# Patient Record
Sex: MALE | Race: OTHER | URBAN - METROPOLITAN AREA
[De-identification: names, ages, dates, MRNs, and addresses within clinical notes are randomized per-mention and may not be internally consistent; named-entity substitution may affect disease eponyms.]

---
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
## 2019-07-29 ENCOUNTER — OFFICE VISIT (OUTPATIENT)
Dept: FAMILY MEDICINE CLINIC | Age: 19
End: 2019-07-29
Payer: COMMERCIAL

## 2019-07-29 VITALS
HEIGHT: 68 IN | HEART RATE: 76 BPM | SYSTOLIC BLOOD PRESSURE: 112 MMHG | RESPIRATION RATE: 12 BRPM | BODY MASS INDEX: 22.37 KG/M2 | WEIGHT: 147.6 LBS | DIASTOLIC BLOOD PRESSURE: 67 MMHG | TEMPERATURE: 98.9 F | OXYGEN SATURATION: 97 %

## 2019-07-29 DIAGNOSIS — Z23 NEED FOR TDAP VACCINATION: ICD-10-CM

## 2019-07-29 DIAGNOSIS — Z23 NEED FOR MMR VACCINE: ICD-10-CM

## 2019-07-29 DIAGNOSIS — Z00.00 WELL ADULT EXAM: Primary | ICD-10-CM

## 2019-07-29 PROCEDURE — 99385 PREV VISIT NEW AGE 18-39: CPT | Performed by: FAMILY MEDICINE

## 2019-07-29 PROCEDURE — 90707 MMR VACCINE SC: CPT | Performed by: FAMILY MEDICINE

## 2019-07-29 PROCEDURE — 90471 IMMUNIZATION ADMIN: CPT | Performed by: FAMILY MEDICINE

## 2019-07-29 PROCEDURE — 90472 IMMUNIZATION ADMIN EACH ADD: CPT | Performed by: FAMILY MEDICINE

## 2019-07-29 PROCEDURE — 90715 TDAP VACCINE 7 YRS/> IM: CPT | Performed by: FAMILY MEDICINE

## 2019-07-29 SDOH — HEALTH STABILITY: MENTAL HEALTH: HOW OFTEN DO YOU HAVE A DRINK CONTAINING ALCOHOL?: NEVER

## 2019-07-29 ASSESSMENT — PATIENT HEALTH QUESTIONNAIRE - PHQ9
2. FEELING DOWN, DEPRESSED OR HOPELESS: 0
SUM OF ALL RESPONSES TO PHQ QUESTIONS 1-9: 0
SUM OF ALL RESPONSES TO PHQ9 QUESTIONS 1 & 2: 0
1. LITTLE INTEREST OR PLEASURE IN DOING THINGS: 0
SUM OF ALL RESPONSES TO PHQ QUESTIONS 1-9: 0

## 2019-07-29 ASSESSMENT — ENCOUNTER SYMPTOMS
DIARRHEA: 0
CONSTIPATION: 0
VOICE CHANGE: 0
ABDOMINAL PAIN: 0
BLOOD IN STOOL: 0
TROUBLE SWALLOWING: 0
SHORTNESS OF BREATH: 0
ANAL BLEEDING: 0
COUGH: 0
CHEST TIGHTNESS: 0
RHINORRHEA: 0

## 2019-07-29 NOTE — PROGRESS NOTES
Genitourinary: Negative for difficulty urinating, discharge, dysuria, frequency and hematuria. Neurological: Negative for dizziness, weakness and headaches. Hematological: Does not bruise/bleed easily. Psychiatric/Behavioral: Negative for dysphoric mood and sleep disturbance. The patient is not nervous/anxious. Vaccines reviewed; had only on MMR at age 2. Discussed with mom; he has no contraindication to vaccines; never had an adverse rxn. Objective:     Physical Exam  NAD  Skin is warm and dry. Well hydrated, no rash. Mood and affect are normal.  Ear exam - bilateral normal, TM intact without perforation or effusion, external canal normal. No significant ceruminosis noted. Nasal exam; septum midline, no deformities, nares patent, normal mucosa without swelling, no polyps, no bleeding. Pharynx normal, no oral lesions, dentition in good repair.   + right facial paralysis. Right arm tremor. The neck is supple and free of adenopathy or masses, the thyroid is normal without enlargement or nodules. Chest is clear, no wheezing or rales. Normal symmetric air entry throughout both lung fields. Heart regular with normal rate, no murmer or gallop  The abdomen is soft without tenderness, guarding, mass, rebound or organomegaly. Bowel sounds are normal. No CVA tenderness or inguinal adenopathy noted. Gait is normal.     Assessment / Plan:      Diagnosis Orders   1. Well adult exam  Exercise, diet, substance abuse addressed  DT every 10 years  Influenza vaccine annually      2. Need for Tdap vaccination  Tdap (age 6y and older) IM ([address])   3. Need for MMR vaccine  MMR vaccine subcutaneous         Follow up in 1 year or prn.

## 2021-11-21 PROBLEM — I63.89 PARIETAL LOBE INFARCTION (HCC): Status: ACTIVE | Noted: 2021-10-27

## 2021-11-21 PROBLEM — D32.9 MENINGIOMA (HCC): Status: ACTIVE | Noted: 2021-11-21

## 2021-11-21 PROBLEM — I63.89 PARIETAL LOBE INFARCTION (HCC): Status: ACTIVE | Noted: 2021-11-21

## 2021-11-22 NOTE — PROGRESS NOTES
Subjective:      Patient ID: Yassine Sams 24 y.o. male. The primary encounter diagnosis was Meningioma [facility]). Diagnoses of S/P craniotomy and Parietal lobe infarction [facility]) were also pertinent to this visit. MARCO Méndez is here for follow up after resection of meningioma one month ago. Had partial resection in 2002. Presented with HA and was noted to have increase in size of meningioma. He had right side weakness post op and was noted to have small parietal lobe/internal capsule infarct on MRI. [facility] for residual tumor is planned. Will need 5 times a week for 6 weeks. Will start in about a month. He is getting PT, OT and ST. He feels he is improving. He is on Keppra x 6 months for seizure prevention. Admits to being depressed. Is considering seeing a therapist. He is sleeping well and feels he does not need he Trazodone. Would like to get off it. He is tolerating it well. Was started post op. Appetite is fine, sleeping well. Not suicidal.     Had flu vaccine last week and will have COVID booster next week.      No results found for: NA, K, CL, CO2, BUN, CREATININE, GLUCOSE, CALCIUM, PROT, LABALBU, BILITOT, ALKPHOS, AST, ALT, LABGLOM, GFRAA, AGRATIO, GLOB       No results found for: WBC, HGB, HCT, MCV, PLT   Outpatient Medications Marked as Taking for the 11/24/21 encounter (Office Visit) with Michelle Hirsch MD   Medication Sig Dispense Refill    melatonin 3 MG TABS tablet Take 6 mg by mouth daily      traZODone (DESYREL) 50 MG tablet Take 50 mg by mouth nightly      levETIRAcetam (KEPPRA) 1000 MG tablet Take 1,000 mg by mouth 2 times daily          No Known Allergies    Patient Active Problem List   Diagnosis    M[facility]oma ([facility] [address]    Parietal lobe infarction [facility])       Past Medical History:   Diagnosis Date    Meningioma ([facility] [address] 2002       Past Surgical History:   Procedure Laterality Date    CRANIAL LESION RESECTION  11/09/2002    tentorial meningioma    CRANIOTOMY  10/27/2021    meningioma resection        No family history on file. Social History     Tobacco Use    Smoking status: Never Smoker    Smokeless tobacco: Never Used   Substance Use Topics    Alcohol use: Never    Drug use: Never            Review of Systems  Review of Systems    Objective:   Physical Exam  Vitals:    11/24/21 1411   BP: (!) 102/58   Pulse: 81   Resp: 12   Temp: 97.8 °F (36.6 °C)   TempSrc: Temporal   SpO2: 97%   Weight: 151 lb 9.6 oz (68.8 kg)       Physical Exam  NAD  Mood and affect are mildly depressed. No agitation or psychomotor retardation. Not suicidal.  Skin is warm and dry. Well hydrated, no rash. Incision left temporal scalp well healed, no induration or dehiscence. Chest is clear, no wheezing or rales. Normal symmetric air entry throughout both lung fields. Heart regular with normal rate, no murmer or gallop  Speech is slightly dysarthric, halting. Right UE spastic. Gait antalgic right      Assessment:       Diagnosis Orders   1. Meningioma [facility])  S/p excision with XRT planned   2. S/P craniotomy  Healing well   3. Parietal lobe infarction ([facility] [address]  Improving with PT, OT, ST   4. Reactive depression (situational)  Addressed. He can see Dr. Violette Petersen or therapist through [employer] at dad's work. Consider SSRI. He declines for now. Ok to wean Trazodone to 25 mg hs x 5 nights, 25 mg every other night x 3 to 4 doses, then d/c. Can resume prn          Plan:      Side effects of current medications reviewed and questions answered. Follow up in 3 months or prn.

## 2021-11-24 ENCOUNTER — OFFICE VISIT (OUTPATIENT)
Dept: FAMILY MEDICINE CLINIC | Age: 21
End: 2021-11-24
Payer: COMMERCIAL

## 2021-11-24 VITALS
RESPIRATION RATE: 12 BRPM | WEIGHT: 151.6 LBS | TEMPERATURE: 97.8 F | OXYGEN SATURATION: 97 % | DIASTOLIC BLOOD PRESSURE: 58 MMHG | BODY MASS INDEX: 23.24 KG/M2 | SYSTOLIC BLOOD PRESSURE: 102 MMHG | HEART RATE: 81 BPM

## 2021-11-24 DIAGNOSIS — D32.9 MENINGIOMA (HCC): Primary | ICD-10-CM

## 2021-11-24 DIAGNOSIS — Z98.890 S/P CRANIOTOMY: ICD-10-CM

## 2021-11-24 DIAGNOSIS — F32.9 REACTIVE DEPRESSION (SITUATIONAL): ICD-10-CM

## 2021-11-24 DIAGNOSIS — I63.89 PARIETAL LOBE INFARCTION (HCC): ICD-10-CM

## 2021-11-24 PROCEDURE — 99214 OFFICE O/P EST MOD 30 MIN: CPT | Performed by: FAMILY MEDICINE

## 2021-11-24 RX ORDER — LANOLIN ALCOHOL/MO/W.PET/CERES
6 CREAM (GRAM) TOPICAL DAILY
COMMUNITY
End: 2022-03-04

## 2021-11-24 RX ORDER — TRAZODONE HYDROCHLORIDE 50 MG/1
50 TABLET ORAL NIGHTLY
Qty: 30 TABLET | Refills: 2 | Status: SHIPPED | OUTPATIENT
Start: 2021-11-24 | End: 2022-03-04

## 2021-11-24 RX ORDER — LEVETIRACETAM 1000 MG/1
1000 TABLET ORAL 2 TIMES DAILY
Qty: 180 TABLET | Refills: 1 | Status: SHIPPED | OUTPATIENT
Start: 2021-11-24 | End: 2022-03-04

## 2021-11-24 RX ORDER — TRAZODONE HYDROCHLORIDE 50 MG/1
50 TABLET ORAL NIGHTLY
COMMUNITY
End: 2021-11-24 | Stop reason: SDUPTHER

## 2021-11-24 RX ORDER — LEVETIRACETAM 1000 MG/1
1000 TABLET ORAL 2 TIMES DAILY
COMMUNITY
End: 2021-11-24 | Stop reason: SDUPTHER

## 2021-11-24 SDOH — ECONOMIC STABILITY: FOOD INSECURITY: WITHIN THE PAST 12 MONTHS, YOU WORRIED THAT YOUR FOOD WOULD RUN OUT BEFORE YOU GOT MONEY TO BUY MORE.: NEVER TRUE

## 2021-11-24 SDOH — ECONOMIC STABILITY: FOOD INSECURITY: WITHIN THE PAST 12 MONTHS, THE FOOD YOU BOUGHT JUST DIDN'T LAST AND YOU DIDN'T HAVE MONEY TO GET MORE.: NEVER TRUE

## 2021-11-24 ASSESSMENT — PATIENT HEALTH QUESTIONNAIRE - PHQ9
SUM OF ALL RESPONSES TO PHQ QUESTIONS 1-9: 0
SUM OF ALL RESPONSES TO PHQ QUESTIONS 1-9: 0
SUM OF ALL RESPONSES TO PHQ9 QUESTIONS 1 & 2: 0
2. FEELING DOWN, DEPRESSED OR HOPELESS: 0
SUM OF ALL RESPONSES TO PHQ QUESTIONS 1-9: 0
1. LITTLE INTEREST OR PLEASURE IN DOING THINGS: 0

## 2021-11-24 ASSESSMENT — SOCIAL DETERMINANTS OF HEALTH (SDOH): HOW HARD IS IT FOR YOU TO PAY FOR THE VERY BASICS LIKE FOOD, HOUSING, MEDICAL CARE, AND HEATING?: NOT HARD AT ALL

## 2022-03-03 NOTE — PROGRESS NOTES
Subjective:      Patient ID: Rena Cobos 20 y.o. male. The primary encounter diagnosis was Reactive depression (situational). Diagnoses of Meningioma ([facility] [address] S/P craniotomy, and Parietal lobe infarction [facility]) were also pertinent to this visit. HPI    Meningioma: s/p resection 10/2021, and proton therapy on 12/27/21 (last tx 2/8/22? at [facility]   Had small parietal infarct post of. Had PT/OT/ST with good recovery of right arm weakness and sensory deficit. He has right side visual loss reported as improving. Sees Dr. Katherine Hayes. Was able to get off Keppra. Weaned off. Saw heme/onc on     Reactive depression:  Stopped taking Trazodone and does feel depressed. He has anxiety but no panic attacks. He does not feel he needs to treat this at this time. Appetite is fine. Mostly eats healthy foods. Was referred to nephrology for BP. At home -130/75-85. 144/90, 144/86 at the nephrologist.  Did eat a high Na lunch that day. Is going to do a 24 hour BP monitor. Had normal UA. Would like his spine checked. Had CT that showed dextroconvex thoracolumbar curve. He has no back pain. He just started to exercise, walking and resistance training. He is doing well with PT. Is still getting OT and ST. Still has loss of sensation right side, slowly coming back. Right shin mass he noted 4 years ago. Has not increased in size. Had bloody ejaculate x 3 over the past year. No hematuria, dysuria, weak stream, abdominal pain. Has not been sexually active.          No outpatient medications have been marked as taking for the 3/4/22 encounter (Office Visit) with Keren Benavidez MD.        No Known Allergies    Patient Active Problem List   Diagnosis    Meningioma ([facility] [address]    Parietal lobe infarction [facility])       Past Medical History:   Diagnosis Date    Meningioma ([facility] [address] 2002       Past Surgical History:   Procedure Laterality Date    CRANIAL LESION RESECTION  11/09/2002    tentorial meningioma    CRANIOTOMY  10/27/2021    meningioma resection        No family history on file. Social History     Tobacco Use    Smoking status: Never Smoker    Smokeless tobacco: Never Used   Substance Use Topics    Alcohol use: Never    Drug use: Never            Review of Systems  Review of Systems    Objective:   Physical Exam  Vitals:    03/04/22 1450   BP: 124/72   Pulse: 87   Resp: 14   Temp: 97.7 °F (36.5 °C)   TempSrc: Temporal   SpO2: 97%   Weight: 166 lb 6.4 oz (75.5 kg)   Height: 5' 8\" (1.727 m)     Wt Readings from Last 3 Encounters:   03/04/22 166 lb 6.4 oz (75.5 kg)   11/24/21 151 lb 9.6 oz (68.8 kg)   07/29/19 147 lb 9.6 oz (67 kg)      BP Readings from Last 3 Encounters:   03/04/22 124/72   11/24/21 (!) 102/58   07/29/19 112/67      Physical Exam  NAD  Skin is warm and dry. Mood and affect are mildly anxious. No agitation or psychomotor retardation. No pressured speech, grandiosity or tangential thoughts. Insight and judgement are intact. Not suicidal.   The neck is supple and free of adenopathy or masses, the thyroid is normal without enlargement or nodules. Chest is clear, no wheezing or rales. Normal symmetric air entry throughout both lung fields. Heart regular with normal rate, no murmer or gallop     Right shin 2 mm firm mobile subcut nodule. Mild thoracolumbar scoliosis  Genitals normal; both testes normal without tenderness, masses, hydroceles, varicoceles, erythema or swelling. Shaft normal, meatus normal without discharge. No inguinal hernia noted. No inguinal lymphadenopathy. Mild tremor. Assessment:       Diagnosis Orders   1. CINDY (generalized anxiety disorder)  Discussed meds, meditation, exercise, counseling. 2. Meningioma [facility])  Per [facility]   3. S/P craniotomy  Doing well with PT/OT/ST   4. Parietal lobe infarction [facility])  Doing well with PT/OT/ST   5. Other idiopathic scoliosis, thoracolumbar region  Core strengthening exercises, monitor. Reassured will likely stay stable. 6. Bloody ejaculation  Reassured           Plan:      Follow up 3 to 6 months or prn.

## 2022-03-04 ENCOUNTER — OFFICE VISIT (OUTPATIENT)
Dept: FAMILY MEDICINE CLINIC | Age: 22
End: 2022-03-04
Payer: COMMERCIAL

## 2022-03-04 VITALS
BODY MASS INDEX: 25.22 KG/M2 | WEIGHT: 166.4 LBS | RESPIRATION RATE: 14 BRPM | DIASTOLIC BLOOD PRESSURE: 72 MMHG | OXYGEN SATURATION: 97 % | HEIGHT: 68 IN | HEART RATE: 87 BPM | SYSTOLIC BLOOD PRESSURE: 124 MMHG | TEMPERATURE: 97.7 F

## 2022-03-04 DIAGNOSIS — D32.9 MENINGIOMA (HCC): ICD-10-CM

## 2022-03-04 DIAGNOSIS — M41.25 OTHER IDIOPATHIC SCOLIOSIS, THORACOLUMBAR REGION: ICD-10-CM

## 2022-03-04 DIAGNOSIS — F41.1 GAD (GENERALIZED ANXIETY DISORDER): Primary | ICD-10-CM

## 2022-03-04 DIAGNOSIS — Z98.890 S/P CRANIOTOMY: ICD-10-CM

## 2022-03-04 DIAGNOSIS — I63.89 PARIETAL LOBE INFARCTION (HCC): ICD-10-CM

## 2022-03-04 DIAGNOSIS — R36.1 BLOODY EJACULATION: ICD-10-CM

## 2022-03-04 PROCEDURE — 99214 OFFICE O/P EST MOD 30 MIN: CPT | Performed by: FAMILY MEDICINE

## 2023-01-03 ENCOUNTER — HOSPITAL ENCOUNTER (EMERGENCY)
Age: 23
Discharge: HOME | End: 2023-01-03
Attending: EMERGENCY MEDICINE
Payer: COMMERCIAL

## 2023-01-03 ENCOUNTER — TELEPHONE (OUTPATIENT)
Dept: FAMILY MEDICINE CLINIC | Age: 23
End: 2023-01-03

## 2023-01-03 VITALS
DIASTOLIC BLOOD PRESSURE: 73 MMHG | TEMPERATURE: 99.5 F | OXYGEN SATURATION: 97 % | SYSTOLIC BLOOD PRESSURE: 136 MMHG | BODY MASS INDEX: 25.47 KG/M2 | WEIGHT: 171.96 LBS | HEIGHT: 69 IN | HEART RATE: 89 BPM | RESPIRATION RATE: 16 BRPM

## 2023-01-03 DIAGNOSIS — R51.9 ACUTE NONINTRACTABLE HEADACHE, UNSPECIFIED HEADACHE TYPE: Primary | ICD-10-CM

## 2023-01-03 LAB
RAPID INFLUENZA  B AGN: NEGATIVE
RAPID INFLUENZA A AGN: NEGATIVE
S PYO AG THROAT QL: NEGATIVE
SARS-COV-2, NAAT: NOT DETECTED

## 2023-01-03 PROCEDURE — 99283 EMERGENCY DEPT VISIT LOW MDM: CPT

## 2023-01-03 PROCEDURE — 87804 INFLUENZA ASSAY W/OPTIC: CPT

## 2023-01-03 PROCEDURE — 87081 CULTURE SCREEN ONLY: CPT

## 2023-01-03 PROCEDURE — 87880 STREP A ASSAY W/OPTIC: CPT

## 2023-01-03 PROCEDURE — 87635 SARS-COV-2 COVID-19 AMP PRB: CPT

## 2023-01-03 RX ORDER — IBUPROFEN 600 MG/1
600 TABLET ORAL EVERY 6 HOURS PRN
COMMUNITY

## 2023-01-03 RX ORDER — MULTIVITAMIN
1 TABLET ORAL DAILY
COMMUNITY

## 2023-01-03 ASSESSMENT — PAIN - FUNCTIONAL ASSESSMENT: PAIN_FUNCTIONAL_ASSESSMENT: 0-10

## 2023-01-03 ASSESSMENT — ENCOUNTER SYMPTOMS
SHORTNESS OF BREATH: 0
VOICE CHANGE: 0
SORE THROAT: 1
COUGH: 0
TROUBLE SWALLOWING: 0
GASTROINTESTINAL NEGATIVE: 1
RHINORRHEA: 0

## 2023-01-03 ASSESSMENT — PAIN DESCRIPTION - PAIN TYPE: TYPE: ACUTE PAIN

## 2023-01-03 ASSESSMENT — PAIN DESCRIPTION - LOCATION: LOCATION: HEAD

## 2023-01-03 ASSESSMENT — PAIN SCALES - GENERAL: PAINLEVEL_OUTOF10: 3

## 2023-01-03 ASSESSMENT — PAIN DESCRIPTION - DESCRIPTORS: DESCRIPTORS: ACHING

## 2023-01-03 NOTE — DISCHARGE INSTRUCTIONS
Please see the attached instructions for how to best care for yourself and when you should return to the emergency department. Please call your primary care physician to schedule a follow-up appointment for soon as possible. You can alternate Tylenol and ibuprofen as needed for the pain. Please do not exceed the recommended dose on the bottle. When should you call for help? Call 911 anytime you think you may need emergency care. For example, call if:    You have signs of a stroke. These may include:  Sudden numbness, paralysis, or weakness in your face, arm, or leg, especially on only one side of your body. Sudden vision changes. Sudden trouble speaking. Sudden confusion or trouble understanding simple statements. Sudden problems with walking or balance. A sudden, severe headache that is different from past headaches. Call your doctor now or seek immediate medical care if:    You have a new or worse headache. Your headache gets much worse. When should you call for help? Call your doctor now or seek immediate medical care if:    You have a fever of 104°F or higher. You have a fever that stays high. You have a fever and feel confused or often feel dizzy. You have trouble breathing. You have a fever with a stiff neck or a severe headache. Watch closely for changes in your health, and be sure to contact your doctor if:    You have any problems with your medicine, or you get a fever after starting a new medicine. You do not get better as expected.

## 2023-01-03 NOTE — ED TRIAGE NOTES
Patient arrived to ED with complaints of flu-like symptoms. Patient stated that he was recently at a wedding with a lot of people and a couple of whom tested positive for COVID.

## 2023-01-03 NOTE — ED PROVIDER NOTES
ED Attending Attestation Note     Date of evaluation: 1/3/2023    This patient was seen by the advance practice provider. I have seen and examined the patient, agree with the workup, evaluation, management and diagnosis. The care plan has been discussed. My assessment reveals a 30-year-old male with past medical history of tentorial meningioma status post resection who presents to the emergency department with about 1 day of fever and headache as well as a mild sore throat. Headache is frontal, constant, mild relief with over-the-counter medications at home. Not better or worse at any particular time of day, not positional, not associated with any focal neurologic symptoms (although patient does have some baseline right-sided weakness following his meningioma resection). Denies nausea and vomiting. States that he was advised to be seen in the ED by his PCP. On my assessment the patient is afebrile, hemodynamically stable, in no acute distress. Heart lungs are clear to auscultation, he is grossly neurologically intact, he has some oropharyngeal erythema but no appreciable edema, tonsillar exudates, or palatal asymmetry. No nuchal rigidity. Overall no clinical concern at this time for meningitis.       Dorota Mandel MD  [address], [place]  01/03/23 [id number]

## 2023-01-03 NOTE — TELEPHONE ENCOUNTER
Patient had a fever of 104 this morning. This started yesterday evening. He does have chills as well.

## 2023-01-03 NOTE — ED PROVIDER NOTES
[address] [facility] [address] ENCOUNTER          PHYSICIAN ASSISTANT NOTE       Date of evaluation: 1/3/2023    Chief Complaint     Headache, Fever (At home 102F), and Back Pain      History of Present Illness     Oni Das is a 20 y.o. male who presents with a fever, headache, and back pain. Patient reports that yesterday evening, started having a headache. Took his temperature and it was 102F. Took some Motrin. Around 2 AM, took temperature again and it was 98.6F. This AM when he woke up, took his temperature and it was 104F, so they called his PCP and was advised to come in for evaluation. Patient reports a headache yesterday evening which has continued but has been minimally relieved with motrin. Feels similar to prior headaches and is not the worst headache he's had. Also had a \"scratchy\" throat this morning. He was at a wedding a few days ago in [address] and had known sick contacts there where other guests had high fevers and tested positive for COVID. No neck pain or stiffness. Denies chills, nausea, vomiting, chest pain, shortness of breath, abdominal pain, or any changes to bowel or bladder habits. Review of Systems     Review of Systems   Constitutional:  Negative for chills and fever. HENT:  Positive for sore throat. Negative for congestion, rhinorrhea, sneezing, trouble swallowing and voice change. Respiratory:  Negative for cough and shortness of breath. Cardiovascular:  Negative for chest pain. Gastrointestinal: Negative. Genitourinary:  Negative for dysuria, frequency and urgency. Musculoskeletal:  Negative for arthralgias, myalgias, neck pain and neck stiffness. Skin: Negative. Neurological:  Negative for weakness and numbness. Past Medical, Surgical, Family, and Social History     He has a past medical history of Meningioma ([facility] [address]   He has a past surgical history that includes cranial lesion resection (11/09/2002) and craniotomy (10/27/2021). His family history is not on file. He reports that he has never smoked. He has never used smokeless tobacco. He reports that he does not drink alcohol and does not use drugs. Medications     Discharge Medication List as of 1/3/2023  2:56 PM        CONTINUE these medications which have NOT CHANGED    Details   ibuprofen (ADVIL;MOTRIN) 600 MG tablet Take 600 mg by mouth every 6 hours as needed for PainHistorical Med      Multiple Vitamin (MULTI-VITAMIN DAILY) TABS Take 1 tablet by mouth dailyHistorical Med             Allergies     He has No Known Allergies. Physical Exam     INITIAL VITALS: BP: 136/73, Temp: 98.2 °F (36.8 °C), Heart Rate: (!) 102, Resp: 18, SpO2: 96 %  Physical Exam  Constitutional:       General: He is not in acute distress. Appearance: Normal appearance. He is not ill-appearing, toxic-appearing or diaphoretic. Comments: Patient resting comfortably in the bed. HENT:      Head: Normocephalic and atraumatic. Mouth/Throat:      Comments: Oropharynx without erythema, exudates, lesions, or swelling. Uvula midline. Tonsils are symmetric with no edema, erythema, or exudate. Gums without erythema. No trismus. Handling secretions well, no drooling. No hot potato voice. Eyes:      General:         Right eye: No discharge. Left eye: No discharge. Cardiovascular:      Rate and Rhythm: Normal rate and regular rhythm. Heart sounds: Normal heart sounds. No murmur heard. No friction rub. No gallop. Pulmonary:      Effort: Pulmonary effort is normal. No respiratory distress. Breath sounds: Normal breath sounds. No stridor. No wheezing, rhonchi or rales. Musculoskeletal:         General: No swelling, tenderness, deformity or signs of injury. Comments: Moving all extremities spontaneously and equally. No midline tenderness, step-offs or deformities to cervical, thoracic, or lumbar spine. No overlying skin changes.      Skin:     General: Skin is warm and dry. Findings: No bruising, erythema, lesion or rash. Neurological:      General: No focal deficit present. Mental Status: He is alert and oriented to person, place, and time. Motor: No weakness. Gait: Gait normal.      Comments: PERRL, EOMI, no slurred speech, no facial asymmetry, facial sensation intact, no pronator drift, normal heel to shin, 5/5 strength in bilateral upper and lower extremities, normal gait. Slightly decreased sensation in right upper and lower extremity which patients state is their baseline. Psychiatric:         Mood and Affect: Mood normal.         Behavior: Behavior normal.        Diagnostic Results     EKG   None    RADIOLOGY:  No orders to display       LABS:   Results for orders placed or performed during the hospital encounter of 01/03/23   COVID-19, Rapid    Specimen: Nasopharyngeal Swab   Result Value Ref Range    SARS-CoV-2, NAAT Not Detected Not Detected   Rapid influenza A/B antigens    Specimen: Nasopharyngeal   Result Value Ref Range    Rapid Influenza A Ag Negative Negative    Rapid Influenza B Ag Negative Negative   Strep Screen Group A Throat    Specimen: Throat   Result Value Ref Range    Rapid Strep A Screen Negative Negative       ED BEDSIDE ULTRASOUND:  None    RECENT VITALS:  BP: 136/73, Temp: 99.5 °F (37.5 °C), Heart Rate: 89, Resp: 16, SpO2: 97 %     Procedures     Procedures  None    ED Course     Nursing Notes, Past Medical Hx,Past Surgical Hx, Social Hx, Allergies, and Family Hx were reviewed. The patient was given the following medications:  No orders of the defined types were placed in this encounter. CONSULTS:  None    MEDICAL DECISION MAKING / ASSESSMENT / PLAN     Enoch Schaffer is a 20 y.o. male with history of meningioma, CINDY, parietal lobe infarction who presents with fever, headache, and sore throat that started yesterday and fever is resolved with Motrin.  On exam, patient is hemodynamically stable with no signs of respiratory distress and is afebrile. Nonfocal neurologic exam with baseline decreased sensation on right side. No midline cervical spine tenderness. See above for additional history and physical exam findings. Chart review, patient called PCP today and reported a fever of 102 and was told to come to ED. COVID and Flu swabs negative. Strep test negative. In regards to patients headache, I believe this is a benign-appearing headache. Patient state sthey regularly get headaches and this feels similar to prior and is not the worst headache they've ever had. The neurologic examination is normal.  My suspicion for serious pathology is low given a lack of significant risk factors and reassuring history and physical examination. No neck tenderness or stiffness. No clinical concern for meningitis. Known sick contact with similar symptoms who tested positive for COVID. Symptoms are improved with motrin and patient has not had a fever since taking motrin earlier this morning. At this time, I believe patient likely has a viral illness. No emergent pathologies noted. Advised patient to take tylenol and ibuprofen as needed for the pain and fever. Advised to follow-up with PCP. Patient is agreeable with this plan. I do not believe that this patient requires any further work-up or inpatient management for their complaints, and are appropriate for outpatient follow-up. The patient has remained hemodynamically stable throughout their stay in the emergency department, and appears well overall. I discussed the findings of my physical exam and work-up with the patient, and they were given the opportunity to ask questions. The patient is agreeable with the plan and is ready to be discharged. Patient instructed to follow-up with PCP as soon as possible, and given strict return precautions.       The patient was evaluated by myself and the ED Attending Physician, Dr. Andrea Mcdonald. All management and disposition plans were discussed and agreed upon. This note was dictated using voice-recognition software, which occasionally leads to inadvertent typographic errors. Clinical Impression     1.  Acute nonintractable headache, unspecified headache type        Disposition     PATIENT REFERRED TO:   MD Nati Garcia [address]  [address]  [phone]    Schedule an appointment as soon as possible for a visit       [facility], [facility]. Emergency [address]  [address]  [phone]    As needed, If symptoms worsen    DISCHARGE MEDICATIONS:   Discharge Medication List as of 1/3/2023  2:56 PM          DISPOSITION Decision To Discharge 01/03/2023 02:55:32 PM        Debi Joyce PA-C  01/03/23 4413

## 2023-01-05 LAB — S PYO THROAT QL CULT: NORMAL

## 2023-03-13 NOTE — PROGRESS NOTES
Preoperative Consultation      Dillno Altman  YOB: 2000    Date of Service:  3/17/2023    Vitals:    03/17/23 1515 03/17/23 1555   BP: 138/78 112/70   Pulse: 80    Resp: 14    Temp: 97.8 °F (36.6 °C)    TempSrc: Temporal    SpO2: 98%    Weight: 163 lb 6.4 oz (74.1 kg)    Height: 5' 9\" (1.753 m)       Wt Readings from Last 2 Encounters:   03/17/23 163 lb 6.4 oz (74.1 kg)   01/03/23 171 lb 15.3 oz (78 kg)     BP Readings from Last 3 Encounters:   01/03/23 136/73   03/04/22 124/72   11/24/21 (!) 102/58        Chief Complaint   Patient presents with    Pre-op Exam     Left Temporal Craniotomy with Stereotactic Guidance for Resection of Tumor with Intraoperative U/S and Spinal Neuro monitoring    At [facility] with Michael Gloria M.D.     No Known Allergies  Outpatient Medications Marked as Taking for the 3/17/23 encounter (Office Visit) with Nevin Mitchell MD   Medication Sig Dispense Refill    ibuprofen (ADVIL;MOTRIN) 600 MG tablet Take 600 mg by mouth every 6 hours as needed for Pain      Multiple Vitamin (MULTI-VITAMIN DAILY) TABS Take 1 tablet by mouth daily         This patient presents to the office today for a preoperative consultation at the request of surgeon, Dr. iMchael Gloria, who plans on performing   Left Temporal Craniotomy with Stereotactic Guidance for Resection of Tumor with Intraoperative U/S and Spinal Neuromonitoring.      on March 24 at [facility].      Planned anesthesia: General   Known anesthesia problems: None   Bleeding risk: No recent or remote history of abnormal bleeding  Personal or FH of DVT/PE: Yes - PGM had DVT in her 80s post-op colon cancer surgery.    Patient objection to receiving blood products: No    Patient Active Problem List   Diagnosis    Meningioma (HCC)    Parietal lobe infarction (HCC)    CINDY (generalized anxiety disorder)    Other idiopathic scoliosis, thoracolumbar region    Bloody ejaculation       Past  Medical History:   Diagnosis Date    Meningioma [facility]) 2002     Past Surgical History:   Procedure Laterality Date    CRANIAL LESION RESECTION  11/09/2002    tentorial meningioma    CRANIOTOMY  10/27/2021    meningioma resection     No family history on file. Social History     Socioeconomic History    Marital status: Single     Spouse name: Not on file    Number of children: Not on file    Years of education: Not on file    Highest education level: Not on file   Occupational History    Not on file   Tobacco Use    Smoking status: Never    Smokeless tobacco: Never   Substance and Sexual Activity    Alcohol use: Never    Drug use: Never    Sexual activity: Never   Other Topics Concern    Not on file   Social History Narrative    Not on file     Social Determinants of Health     Financial Resource Strain: Not on file   Food Insecurity: Not on file   Transportation Needs: Not on file   Physical Activity: Not on file   Stress: Not on file   Social Connections: Not on file   Intimate Partner Violence: Not on file   Housing Stability: Not on file       Review of Systems  A comprehensive review of systems was negative except for what was noted in the HPI. Physical Exam   Constitutional: He is oriented to person, place, and time. He appears well-developed and well-nourished. No distress. HENT:   Head: Normocephalic and atraumatic. Mouth/Throat: Uvula is midline, oropharynx is clear and moist and mucous membranes are normal.   Eyes: Conjunctivae and EOM are normal. Pupils are equal, round, and reactive to light. Neck: Trachea normal and normal range of motion. Neck supple. No JVD present. Carotid bruit is not present. No mass and no thyromegaly present. Cardiovascular: Normal rate, regular rhythm, normal heart sounds and intact distal pulses. Exam reveals no gallop and no friction rub. No murmur heard. Pulmonary/Chest: Effort normal and breath sounds normal. No respiratory distress. He has no wheezes.  He has no rales. Abdominal: Soft. Normal aorta and bowel sounds are normal. He exhibits no distension and no mass. There is no hepatosplenomegaly. No tenderness. Musculoskeletal: He exhibits no edema and no tenderness. Neurological: He is alert and oriented to person, place, and time. He has normal strength. No cranial nerve deficit or sensory deficit.gati antalgic left. Skin: Skin is warm and dry. No rash noted. No erythema. Psychiatric: He has a normal mood and affect. His behavior is normal.     EKG Interpretation:  normal EKG, normal sinus rhythm, unchanged from previous tracings. Lab Review reviewed on [facility], normal.         Assessment:       24 y.o. patient with planned surgery as above. Known risk factors for perioperative complications: None  Current medications which may produce withdrawal symptoms if withheld perioperatively: none      Plan:     1. Preoperative workup as follows: ECG, hemoglobin, hematocrit, electrolytes, creatinine, glucose  2. Change in medication regimen before surgery: N/A- not on any medications. Hold NSAIDS for one week prior to surgery. 3. Prophylaxis for cardiac events with perioperative beta-blockers: Not indicated  ACC/AHA indications for pre-operative beta-blocker use:    Vascular surgery with history of postitive stress test  Intermediate or high risk surgery with history of CAD   Intermediate or high risk surgery with multiple clinical predictors of CAD- 2 of the following: history of compensated or prior heart failure, history of cerebrovascular disease, DM, or renal insufficiency    Routine administration of higher-dose, long-acting metoprolol in beta-blocker-naïve patients on the day of surgery, and in the absence of dose titration is associated with an overall increase in mortality. Beta-blockers should be started days to weeks prior to surgery and titrated to pulse < 70.  4. Deep vein thrombosis prophylaxis: regimen to be chosen by surgical team  5. No contraindications to planned surgery

## 2023-03-17 ENCOUNTER — OFFICE VISIT (OUTPATIENT)
Dept: FAMILY MEDICINE CLINIC | Age: 23
End: 2023-03-17
Payer: COMMERCIAL

## 2023-03-17 VITALS
TEMPERATURE: 97.8 F | BODY MASS INDEX: 24.2 KG/M2 | DIASTOLIC BLOOD PRESSURE: 70 MMHG | SYSTOLIC BLOOD PRESSURE: 112 MMHG | HEART RATE: 80 BPM | RESPIRATION RATE: 14 BRPM | HEIGHT: 69 IN | OXYGEN SATURATION: 98 % | WEIGHT: 163.4 LBS

## 2023-03-17 DIAGNOSIS — D32.9 MENINGIOMA (HCC): ICD-10-CM

## 2023-03-17 DIAGNOSIS — Z01.818 PREOP EXAMINATION: Primary | ICD-10-CM

## 2023-03-17 PROCEDURE — 99215 OFFICE O/P EST HI 40 MIN: CPT | Performed by: FAMILY MEDICINE

## 2023-03-17 PROCEDURE — 93000 ELECTROCARDIOGRAM COMPLETE: CPT | Performed by: FAMILY MEDICINE

## 2023-03-17 SDOH — ECONOMIC STABILITY: HOUSING INSECURITY
IN THE LAST 12 MONTHS, WAS THERE A TIME WHEN YOU DID NOT HAVE A STEADY PLACE TO SLEEP OR SLEPT IN A SHELTER (INCLUDING NOW)?: NO

## 2023-03-17 SDOH — ECONOMIC STABILITY: INCOME INSECURITY: HOW HARD IS IT FOR YOU TO PAY FOR THE VERY BASICS LIKE FOOD, HOUSING, MEDICAL CARE, AND HEATING?: NOT HARD AT ALL

## 2023-03-17 SDOH — ECONOMIC STABILITY: FOOD INSECURITY: WITHIN THE PAST 12 MONTHS, THE FOOD YOU BOUGHT JUST DIDN'T LAST AND YOU DIDN'T HAVE MONEY TO GET MORE.: NEVER TRUE

## 2023-03-17 SDOH — ECONOMIC STABILITY: FOOD INSECURITY: WITHIN THE PAST 12 MONTHS, YOU WORRIED THAT YOUR FOOD WOULD RUN OUT BEFORE YOU GOT MONEY TO BUY MORE.: NEVER TRUE

## 2023-03-17 ASSESSMENT — PATIENT HEALTH QUESTIONNAIRE - PHQ9
7. TROUBLE CONCENTRATING ON THINGS, SUCH AS READING THE NEWSPAPER OR WATCHING TELEVISION: 0
10. IF YOU CHECKED OFF ANY PROBLEMS, HOW DIFFICULT HAVE THESE PROBLEMS MADE IT FOR YOU TO DO YOUR WORK, TAKE CARE OF THINGS AT HOME, OR GET ALONG WITH OTHER PEOPLE: 0
SUM OF ALL RESPONSES TO PHQ9 QUESTIONS 1 & 2: 0
SUM OF ALL RESPONSES TO PHQ QUESTIONS 1-9: 0
3. TROUBLE FALLING OR STAYING ASLEEP: 0
9. THOUGHTS THAT YOU WOULD BE BETTER OFF DEAD, OR OF HURTING YOURSELF: 0
8. MOVING OR SPEAKING SO SLOWLY THAT OTHER PEOPLE COULD HAVE NOTICED. OR THE OPPOSITE, BEING SO FIGETY OR RESTLESS THAT YOU HAVE BEEN MOVING AROUND A LOT MORE THAN USUAL: 0
4. FEELING TIRED OR HAVING LITTLE ENERGY: 0
SUM OF ALL RESPONSES TO PHQ QUESTIONS 1-9: 0
2. FEELING DOWN, DEPRESSED OR HOPELESS: 0
SUM OF ALL RESPONSES TO PHQ QUESTIONS 1-9: 0
5. POOR APPETITE OR OVEREATING: 0
SUM OF ALL RESPONSES TO PHQ QUESTIONS 1-9: 0
6. FEELING BAD ABOUT YOURSELF - OR THAT YOU ARE A FAILURE OR HAVE LET YOURSELF OR YOUR FAMILY DOWN: 0
1. LITTLE INTEREST OR PLEASURE IN DOING THINGS: 0

## 2023-03-27 ENCOUNTER — TELEPHONE (OUTPATIENT)
Dept: FAMILY MEDICINE CLINIC | Age: 23
End: 2023-03-27

## 2023-09-28 ENCOUNTER — TELEPHONE (OUTPATIENT)
Age: 23
End: 2023-09-28

## 2023-09-28 DIAGNOSIS — R30.0 DYSURIA: Primary | ICD-10-CM

## 2023-09-28 NOTE — TELEPHONE ENCOUNTER
Ok to  urine spec bottle and take to lab. Order signed. Make sure he does not have a fever or abdominal pain.   Thanks

## 2023-09-28 NOTE — TELEPHONE ENCOUNTER
Left message for Oliva Eaton, said OK to pick-up but if Donnald Sender has a fever or abdominal pain to give a call back to discuss.

## 2023-09-28 NOTE — TELEPHONE ENCOUNTER
Pt's parent called stating pt might have a possible UTI and  is not able to come in office.  Pt's mom wanted to know if she can come to the office and get a cup for the office to get a sample or what she should do.     [phone]

## 2023-10-13 ENCOUNTER — OFFICE VISIT (OUTPATIENT)
Age: 23
End: 2023-10-13

## 2023-10-13 VITALS
HEART RATE: 93 BPM | TEMPERATURE: 97.2 F | OXYGEN SATURATION: 98 % | RESPIRATION RATE: 16 BRPM | DIASTOLIC BLOOD PRESSURE: 80 MMHG | WEIGHT: 170 LBS | SYSTOLIC BLOOD PRESSURE: 114 MMHG | BODY MASS INDEX: 25.76 KG/M2 | HEIGHT: 68 IN

## 2023-10-13 DIAGNOSIS — R29.898 WEAKNESS OF RIGHT HAND: ICD-10-CM

## 2023-10-13 DIAGNOSIS — Z86.73 HISTORY OF CEREBRAL INFARCTION: ICD-10-CM

## 2023-10-13 DIAGNOSIS — M41.25 OTHER IDIOPATHIC SCOLIOSIS, THORACOLUMBAR REGION: ICD-10-CM

## 2023-10-13 DIAGNOSIS — Z01.818 PRE-OP EXAM: Primary | ICD-10-CM

## 2023-10-13 DIAGNOSIS — D32.0 MENINGIOMA, RECURRENT OF BRAIN (HCC): ICD-10-CM

## 2023-10-13 RX ORDER — POLYETHYLENE GLYCOL 3350 17 G/17G
POWDER, FOR SOLUTION ORAL
COMMUNITY
Start: 2023-10-02

## 2023-10-13 RX ORDER — BACLOFEN 10 MG/1
TABLET ORAL
COMMUNITY
Start: 2023-10-03

## 2023-10-13 NOTE — PROGRESS NOTES
masses, organomegaly  Extremities - Extremities normal, except decreased right hand extension/ decorticate . No  edema, or skin discoloration,. RUE:  decreased hand extension on right with weakness to hand/ fingers. Musculoskeletal - Spine ROM normal. Muscular strength intact, except decrease LLE and right hand. .  Peripheral pulses - radial=4/4,, femoral=4/4, popliteal=4/4, dorsalis pedis=4/4,  Neuro -  Reflexes normal and symmetric. Sensation grossly normal.      EKG: normal EKG, normal sinus rhythm. Assessment:        Per encounter diagnoses  He is medically cleared for surgery and anesthesia. Avoid Aspirin, non steroidal anti inflammatory medications, including Motrin, Aleve, Ibuprofen, Advil; multi vitamins, Vitamin E, omega 3 fish oil, and glucosamine chondroitin for the 7 days prior to surgery. 1. Pre-op exam  - Medically cleared. - EKG 12 Lead - Clinic Performed  - CBC normal with labs done 9/26/23.    2. Meningioma, recurrent of brain ([address])  - Scheduled for surgery. 3. Weakness of right hand  -  Stable. 4. Other idiopathic scoliosis, thoracolumbar region  - Stable. 5. History of cerebral infarction  - Stable. Plan:        Per operating surgeon. See also orders filed with this encounter, if any.

## 2023-12-29 LAB
A/G RATIO: 1 UNK (ref 1–2)
ABSOLUTE BASO #: 0.02 X10(3)/MCL (ref 0–0.1)
ABSOLUTE EOS #: 0.02 X10(3)/MCL (ref 0–0.6)
ABSOLUTE LYMPH #: 1.34 X10(3)/MCL (ref 1–4.8)
ABSOLUTE MONO #: 0.21 X10(3)/MCL (ref 0–0.6)
ABSOLUTE NEUT #: 1.2 X10(3)/MCL (ref 1.8–7.7)
ALBUMIN SERPL-MCNC: 3.1 GM/DL (ref 3.4–5)
ALBUMIN SERPL-MCNC: 3.1 GM/DL (ref 3.4–5)
ALP BLD-CCNC: 55 UNIT/L (ref 40–150)
ALT SERPL-CCNC: 24 UNIT/L (ref 12–78)
ANION GAP SERPL CALCULATED.3IONS-SCNC: 5 MMOL/L (ref 4–15)
AST SERPL-CCNC: 11 UNIT/L (ref 9–37)
BASOPHILS # BLD: 0.7 % (ref 0–1)
BILIRUB SERPL-MCNC: 0.5 MG/DL (ref 0.1–1.1)
BILIRUBIN DIRECT: 0.1 MG/DL (ref 0–0.2)
BUN BLDV-MCNC: 10 MG/DL (ref 7–18)
CALCIUM SERPL-MCNC: 8.9 MG/DL (ref 8.3–10.1)
CHLORIDE BLD-SCNC: 109 MMOL/L (ref 98–107)
CO2: 30 MMOL/L (ref 21–31)
CREAT SERPL-MCNC: 0.95 MG/DL (ref 0.67–1.17)
EOSINOPHIL # BLD: 0.7 % (ref 0–5)
GFR, ESTIMATED: >60 ML/MIN/1.73M2
GLOBULIN: 3.6 GM/DL
GLUCOSE BLD-MCNC: 108 MG/DL (ref 74–106)
HCT VFR BLD CALC: 39.8 % (ref 40–52)
HEMOGLOBIN: 13.6 GM/DL (ref 13.3–17.7)
IMMATURE GRANS (ABS): 0.03 X10(3)/MCL (ref 0–0.09)
IMMATURE GRANULOCYTES %: 1.1 % (ref 0–0.6)
LYMPHOCYTES # BLD: 47.5 % (ref 15–45)
MCH RBC QN AUTO: 28.3 PG (ref 26–34)
MCHC RBC AUTO-ENTMCNC: 34.2 GM/DL (ref 31–36)
MCV RBC AUTO: 82.7 FL (ref 80–96)
MONOCYTES # BLD: 7.4 % (ref 0–8)
NRBC AUTOMATED: 0 %
NUCLEATED RBCS: 0 X10(3)/MCL
PDW BLD-RTO: 13.7 %
PHOSPHORUS: 3.8 MG/DL (ref 2.5–4.9)
PLATELET # BLD: 167 X10(3)/MCL (ref 135–466)
PMV BLD AUTO: 9 FL (ref 9.7–11.9)
POTASSIUM SERPL-SCNC: 3.6 MMOL/L (ref 3.5–5.1)
RBC # BLD: 4.81 X10(6)/MCL (ref 4.4–5.9)
SEGS: 42.6 % (ref 40–70)
SODIUM BLD-SCNC: 144 MMOL/L (ref 136–145)
TOTAL PROTEIN: 6.7 GM/DL (ref 6.4–8.2)
WBC # BLD: 2.82 X10(3)/MCL (ref 4.5–11)

## 2024-01-02 LAB
BILIRUBIN URINE: NEGATIVE
BLOOD, URINE: NEGATIVE
CLARITY: CLEAR
COLOR: YELLOW
CREATININE URINE: 132 MG/DL
GLUCOSE URINE: NEGATIVE MG/DL
INR BLD: 1.13 UNK
KETONES, URINE: NEGATIVE MG/DL
LEUKOCYTE ESTERASE, URINE: NEGATIVE
NITRITE, URINE: NEGATIVE
PH UA: 6.5 (ref 5–8)
PROT/CREAT RATIO, UR: 0.25 MG/MG
PROTEIN UA: NEGATIVE MG/DL
PROTEIN,TOT TIMED UR: 32.5 MG/DL
SPECIFIC GRAVITY UA: 1.01 (ref 1–1.03)
UROBILINOGEN, URINE: 0.2 MG/DL